# Patient Record
Sex: FEMALE | Race: WHITE
[De-identification: names, ages, dates, MRNs, and addresses within clinical notes are randomized per-mention and may not be internally consistent; named-entity substitution may affect disease eponyms.]

---

## 2018-10-06 ENCOUNTER — HOSPITAL ENCOUNTER (EMERGENCY)
Dept: HOSPITAL 25 - UCCORT | Age: 63
Discharge: HOME | End: 2018-10-06
Payer: COMMERCIAL

## 2018-10-06 VITALS — DIASTOLIC BLOOD PRESSURE: 51 MMHG | SYSTOLIC BLOOD PRESSURE: 133 MMHG

## 2018-10-06 DIAGNOSIS — Z88.6: ICD-10-CM

## 2018-10-06 DIAGNOSIS — F17.210: ICD-10-CM

## 2018-10-06 DIAGNOSIS — Z88.1: ICD-10-CM

## 2018-10-06 DIAGNOSIS — Z88.8: ICD-10-CM

## 2018-10-06 DIAGNOSIS — Z88.0: ICD-10-CM

## 2018-10-06 DIAGNOSIS — N39.0: Primary | ICD-10-CM

## 2018-10-06 PROCEDURE — 87077 CULTURE AEROBIC IDENTIFY: CPT

## 2018-10-06 PROCEDURE — 99212 OFFICE O/P EST SF 10 MIN: CPT

## 2018-10-06 PROCEDURE — 87186 SC STD MICRODIL/AGAR DIL: CPT

## 2018-10-06 PROCEDURE — 87086 URINE CULTURE/COLONY COUNT: CPT

## 2018-10-06 PROCEDURE — G0463 HOSPITAL OUTPT CLINIC VISIT: HCPCS

## 2018-10-06 PROCEDURE — 81003 URINALYSIS AUTO W/O SCOPE: CPT

## 2018-10-06 NOTE — ED
GI/ HPI





- HPI Summary


HPI Summary: 





had some low back pain which began two days ago and has persisted, pain 

lowgrade burning noted , without dysuria or frequency





- History of Current Complaint


Chief Complaint: UCGU


Time Seen by Provider: 10/06/18 18:52


Stated Complaint: URINARY


Hx Last Menstrual Period: 2004


Timing: Constant


Severity: Moderate


Current Severity: Moderate


Pain Intensity: 6


Location of Pain: Flank


Pain Characteristics: Dull, Pressure


Pain Radiates to: Flank


Associated Signs and Symptoms: Positive: Negative


Aggravating Factor(s): Nothing


Alleviating Factor(s): Nothing





- Allergy/Home Medications


Allergies/Adverse Reactions: 


 Allergies











Allergy/AdvReac Type Severity Reaction Status Date / Time


 


amoxicillin [From Augmentin] AdvReac Intermediate Diarrhea Verified 10/06/18 19:

13


 


clavulanic acid AdvReac Intermediate Diarrhea Verified 10/06/18 19:13





[From Augmentin]     


 


nifedipine [From Procardia] AdvReac Intermediate leg Verified 10/06/18 19:13





   swelling  


 


ibuprofen AdvReac  Has hx of Verified 10/06/18 19:15





   ulcers  











Home Medications: 


 Home Medications





Mirabegron (NF) [Myrbetriq (NF)] 25 mg PO DAILY 10/06/18 [History Confirmed 10/

06/18]











PMH/Surg Hx/FS Hx/Imm Hx


Previously Healthy: No


Endocrine/Hematology History: Reports: Hx Thyroid Disease - HYPOTHYROID


   Denies: Hx Diabetes


Cardiovascular History: Reports: Hx Hypertension - CONTROL WITH MED


GI History: Reports: Hx Gastroesophageal Reflux Disease - CONTROL WITH MEDS, Hx 

Irritable Bowel, Hx Ulcer - HX OF


 History: Reports: Hx Kidney Infection - FREQUENT, Other  Problems/

Disorders - bladder neck stricture, recently dilated 


Sensory History: Reports: Hx Contacts or Glasses - CONTACTS, WILL WEAR GLASSES 

DAY OF SURGERY


   Denies: Hx Hearing Aid


Opthamlomology History: Reports: Hx Contacts or Glasses - CONTACTS, WILL WEAR 

GLASSES DAY OF SURGERY


Psychiatric History: Reports: Hx Anxiety, Hx Depression - CONTROL WITH MED





- Surgical History


Surgery Procedure, Year, and Place: GB REMOVED 10 YRS AGO; TUBAL LIGATION 29 

YEARS AGO.  DENIES ANY OTHER KNOWN CONDITIONS. Had Ureter streched R/T narrowing


Hx Anesthesia Reactions: Yes - HARD TO WAKE UP


Infectious Disease History: No


Infectious Disease History: 


   Denies: History Other Infectious Disease, Traveled Outside the [place] in Last 30 

Days





- Family History


Known Family History: 


   Negative: Cardiac Disease


Family History: No FMHx blood clots





- Social History


Alcohol Use: Occasionally


Substance Use Type: Reports: None


Smoking Status (MU): Light Every Day Tobacco Smoker


Type: Cigarettes


Amount Used/How Often: 5 CIGARETTES PER DAY FOR 16 YEARS


Length of Time of Smoking/Using Tobacco: 16 YEARS


Have You Smoked in the Last Year: Yes





Review of Systems


All Other Systems Reviewed And Are Negative: Yes





Physical Exam


Triage Information Reviewed: Yes


Vital Signs On Initial Exam: 


 Initial Vitals











Temp Pulse Resp BP Pulse Ox


 


 36.9 C   84   16   133/51   98 


 


 10/06/18 18:58  10/06/18 18:58  10/06/18 18:58  10/06/18 18:58  10/06/18 18:58











Vital Signs Reviewed: Yes


Appearance: Positive: Well-Appearing


Skin: Positive: Warm


Head/Face: Positive: Normal Head/Face Inspection


Eyes: Positive: Normal


ENT: Positive: Normal ENT inspection


Respiratory/Lung Sounds: Positive: Clear to Auscultation


Cardiovascular: Positive: Normal


Abdomen Description: Positive: Nontender, CVA Tenderness (R), CVA Tenderness (L)





Diagnostics





- Vital Signs


 Vital Signs











  Temp Pulse Resp BP Pulse Ox


 


 10/06/18 18:58  36.9 C  84  16  133/51  98














- Laboratory


Lab Results: 


 Lab Results











  10/06/18 Range/Units





  19:17 


 


POC Urine Color  Yellow  


 


POC Urine Clarity  Clear  


 


POC Urine pH  6.0  (5-9)  


 


POC Ur Specif Gravity  1.010  (1.010-1.030)  


 


POC Urine Protein  Negative  (Negative)  


 


POC Ur Glucose (UA)  Negative  (Negative)  


 


POC Urine Ketones  Negative  (Negative)  


 


POC Urine Blood  Negative  (Negative)  


 


POC Urine Nitrite  Negative  (Negative)  


 


POC Urine Bilirubin  Negative  (Negative)  


 


POC Urine Urobilinogen  0.2  (Negative)  


 


POC U Leukocyte Esteras  1+ A  (Negative)  











Lab Statement: Any lab studies that have been ordered have been reviewed, and 

results considered in the medical decision making process.





GIGU Course/Dx





- Diagnoses


Provider Diagnoses: 


 UTI (urinary tract infection)








- Additional Visit Information


Is Visit Pregnancy Related: No





Discharge





- Sign-Out/Discharge


Documenting (check all that apply): Patient Departure


All imaging exams completed and their final reports reviewed: Yes





- Discharge Plan


Condition: Good


Disposition: HOME


Prescriptions: 


Ciprofloxacin TAB* [Cipro 500 MG TAB*] 500 mg PO BID #14 tab


Patient Education Materials:  Urinary Tract Infection in Women (DC)


Referrals: 


Austyn Cano MD [Primary Care Provider] - 





- Billing Disposition and Condition


Condition: GOOD


Disposition: Home